# Patient Record
Sex: FEMALE | Race: WHITE | URBAN - METROPOLITAN AREA
[De-identification: names, ages, dates, MRNs, and addresses within clinical notes are randomized per-mention and may not be internally consistent; named-entity substitution may affect disease eponyms.]

---

## 2017-01-30 DIAGNOSIS — N92.6 IRREGULAR MENSTRUAL CYCLE: Primary | ICD-10-CM

## 2017-01-30 NOTE — TELEPHONE ENCOUNTER
Previfem      Last Written Prescription Date: 6/17/16  Last Fill Quantity: 84,  # refills: 2   Last Office Visit with [facility], P or [facility] prescribing provider: 8/30/16    Emily Mccormack CMA

## 2017-01-31 RX ORDER — NORGESTIMATE AND ETHINYL ESTRADIOL
KIT
Qty: 84 TABLET | Refills: 1 | Status: SHIPPED | OUTPATIENT
Start: 2017-01-31 | End: 2017-05-18

## 2017-05-18 ENCOUNTER — OFFICE VISIT (OUTPATIENT)
Dept: FAMILY MEDICINE | Facility: CLINIC | Age: 27
End: 2017-05-18
Payer: COMMERCIAL

## 2017-05-18 VITALS
HEIGHT: 71 IN | DIASTOLIC BLOOD PRESSURE: 78 MMHG | BODY MASS INDEX: 39.76 KG/M2 | OXYGEN SATURATION: 100 % | RESPIRATION RATE: 16 BRPM | SYSTOLIC BLOOD PRESSURE: 114 MMHG | WEIGHT: 284 LBS | TEMPERATURE: 98.7 F | HEART RATE: 81 BPM

## 2017-05-18 DIAGNOSIS — L70.0 ACNE VULGARIS: ICD-10-CM

## 2017-05-18 DIAGNOSIS — Z00.00 ENCOUNTER FOR ROUTINE ADULT HEALTH EXAMINATION WITHOUT ABNORMAL FINDINGS: Primary | ICD-10-CM

## 2017-05-18 DIAGNOSIS — E66.812 OBESITY, CLASS II, BMI 35-39.9: ICD-10-CM

## 2017-05-18 DIAGNOSIS — N92.6 IRREGULAR MENSTRUAL CYCLE: ICD-10-CM

## 2017-05-18 DIAGNOSIS — Z11.3 SCREEN FOR STD (SEXUALLY TRANSMITTED DISEASE): ICD-10-CM

## 2017-05-18 PROCEDURE — 87591 N.GONORRHOEAE DNA AMP PROB: CPT | Performed by: NURSE PRACTITIONER

## 2017-05-18 PROCEDURE — 99395 PREV VISIT EST AGE 18-39: CPT | Performed by: NURSE PRACTITIONER

## 2017-05-18 PROCEDURE — 87491 CHLMYD TRACH DNA AMP PROBE: CPT | Performed by: NURSE PRACTITIONER

## 2017-05-18 RX ORDER — NORGESTIMATE AND ETHINYL ESTRADIOL 0.25-0.035
1 KIT ORAL DAILY
Qty: 84 TABLET | Refills: 4 | Status: SHIPPED | OUTPATIENT
Start: 2017-05-18 | End: 2018-07-16

## 2017-05-18 NOTE — NURSING NOTE
"Chief Complaint   Patient presents with     Physical       Initial /78 (Cuff Size: Adult Large)  Pulse 81  Temp 98.7  F (37.1  C) (Tympanic)  Resp 16  Ht 5' 11\" (1.803 m)  Wt 284 lb (128.8 kg)  LMP 04/20/2017 (Approximate)  SpO2 100%  BMI 39.61 kg/m2 Estimated body mass index is 39.61 kg/(m^2) as calculated from the following:    Height as of this encounter: 5' 11\" (1.803 m).    Weight as of this encounter: 284 lb (128.8 kg).  Medication Reconciliation: complete   Lucila Johnson, CMA    "

## 2017-05-18 NOTE — MR AVS SNAPSHOT
After Visit Summary   5/18/2017    Stephanie Carmichael    MRN: [id number]           Patient Information     Date Of Birth          1990        Visit Information        Provider Department      5/18/2017 4:30 PM Lily Mace [place]N [facility]        Today's Diagnoses     Encounter for routine adult health examination without abnormal findings    -  1    Acne vulgaris        Irregular menstrual cycle        Obesity, Class II, BMI 35-39.9 (H)        Screen for STD (sexually transmitted disease)          Care Instructions      Preventive Health Recommendations  Female Ages 26 - 39  Yearly exam:   See your health care provider every year in order to    Review health changes.     Discuss preventive care.      Review your medicines if you your doctor has prescribed any.    Until age 30: Get a Pap test every three years (more often if you have had an abnormal result).    After age 30: Talk to your doctor about whether you should have a Pap test every 3 years or have a Pap test with HPV screening every 5 years.   You do not need a Pap test if your uterus was removed (hysterectomy) and you have not had cancer.  You should be tested each year for STDs (sexually transmitted diseases), if you're at risk.   Talk to your provider about how often to have your cholesterol checked.  If you are at risk for diabetes, you should have a diabetes test (fasting glucose).  Shots: Get a flu shot each year. Get a tetanus shot every 10 years.   Nutrition:     Eat at least 5 servings of fruits and vegetables each day.    Eat whole-grain bread, whole-wheat pasta and brown rice instead of white grains and rice.    Talk to your provider about Calcium and Vitamin D.     Lifestyle    Exercise at least 150 minutes a week (30 minutes a day, 5 days of the week). This will help you control your weight and prevent disease.    Limit alcohol to one drink per day.    No smoking.     Wear sunscreen to prevent skin  "cancer.    See your dentist every six months for an exam and cleaning.          Follow-ups after your visit        Who to contact     If you have questions or need follow up information about today's clinic visit or your schedule please contact [facility] [place] directly at [phone].  Normal or non-critical lab and imaging results will be communicated to you by MyChart, letter or phone within 4 business days after the clinic has received the results. If you do not hear from us within 7 days, please contact the clinic through Novanhart or phone. If you have a critical or abnormal lab result, we will notify you by phone as soon as possible.  Submit refill requests through Traklight or call your pharmacy and they will forward the refill request to us. Please allow 3 business days for your refill to be completed.          Additional Information About Your Visit        MyChart Information     Traklight gives you secure access to your electronic health record. If you see a primary care provider, you can also send messages to your care team and make appointments. If you have questions, please call your primary care clinic.  If you do not have a primary care provider, please call [phone] and they will assist you.        Care EveryWhere ID     This is your Care EveryWhere ID. This could be used by other organizations to access your [facility] medical records  [id number]        Your Vitals Were     Pulse Temperature Respirations Height Last Period Pulse Oximetry    81 98.7  F (37.1  C) (Tympanic) 16 5' 11\" (1.803 m) 04/20/2017 (Approximate) 100%    BMI (Body Mass Index)                   39.61 kg/m2            Blood Pressure from Last 3 Encounters:   05/18/17 114/78   08/30/16 126/74   03/14/16 117/79    Weight from Last 3 Encounters:   05/18/17 284 lb (128.8 kg)   08/30/16 273 lb (123.8 kg)   03/14/16 276 lb (125.2 kg)              We Performed the Following     Chlamydia trachomatis PCR     Neisseria " gonorrhoeae PCR          Today's Medication Changes          These changes are accurate as of: 5/18/17  5:16 PM.  If you have any questions, ask your nurse or doctor.               These medicines have changed or have updated prescriptions.        Dose/Directions    norgestimate-ethinyl estradiol 0.25-35 MG-MCG per tablet   Commonly known as:  PREVIFEM   This may have changed:  See the new instructions.   Used for:  Irregular menstrual cycle   Changed by:  Lily Mace APRN CNP        Dose:  1 tablet   Take 1 tablet by mouth daily   Quantity:  84 tablet   Refills:  4            Where to get your medicines      These medications were sent to [id number] - [place] - [id number]  [id number], [place] [address]     Phone:  [phone]     norgestimate-ethinyl estradiol 0.25-35 MG-MCG per tablet                Primary Care Provider Office Phone # Fax #    SANGITA Patrick [phone]       [address]  [place] [address]        Thank you!     Thank you for choosing [facility]  for your care. Our goal is always to provide you with excellent care. Hearing back from our patients is one way we can continue to improve our services. Please take a few minutes to complete the written survey that you may receive in the mail after your visit with us. Thank you!             Your Updated Medication List - Protect others around you: Learn how to safely use, store and throw away your medicines at www.disposemymeds.org.          This list is accurate as of: 5/18/17  5:16 PM.  Always use your most recent med list.                   Brand Name Dispense Instructions for use    DAILY MULTIVITAMIN PO      Take by mouth daily       norgestimate-ethinyl estradiol 0.25-35 MG-MCG per tablet    PREVIFEM    84 tablet    Take 1 tablet by mouth daily

## 2017-05-18 NOTE — PROGRESS NOTES
SUBJECTIVE:     CC: Stephanie Carmichael is an 26 year old woman who presents for preventive health visit.     Healthy Habits:    Do you get at least three servings of calcium containing foods daily (dairy, green leafy vegetables, etc.)? yes    Amount of exercise or daily activities, outside of work: 1 day(s) per week    Problems taking medications regularly No    Medication side effects: No    Have you had an eye exam in the past two years? yes    Do you see a dentist twice per year? no    Do you have sleep apnea, excessive snoring or daytime drowsiness?no        Today's PHQ-2 Score:   PHQ-2 ( 1999 Pfizer) 8/30/2016 3/14/2016   Q1: Little interest or pleasure in doing things 0 0   Q2: Feeling down, depressed or hopeless 0 0   PHQ-2 Score 0 0       Abuse: Current or Past(Physical, Sexual or Emotional)- No  Do you feel safe in your environment - Yes    Social History   Substance Use Topics     Smoking status: Never Smoker     Smokeless tobacco: Never Used     Alcohol use 0.0 - 0.6 oz/week     0 - 1 Standard drinks or equivalent per week      Comment: 1 drink per month     The patient does not drink >3 drinks per day nor >7 drinks per week.    Recent Labs   Lab Test  02/12/14   0942  12/30/11   1110   CHOL  178  169   HDL  61  66   LDL  97  82   TRIG  101  108   CHOLHDLRATIO  2.9  2.6       Reviewed orders with patient.  Reviewed health maintenance and updated orders accordingly - Yes    Mammo Decision Support:  Mammo discussed, not appropriate for or declined by this patient.       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  Has college degree in nutrition. .       ROS:  C: NEGATIVE for fever, chills, change in weight  I: NEGATIVE for worrisome rashes, moles or lesions  E: NEGATIVE for vision changes or irritation; glasses  ENT: NEGATIVE for ear, mouth and throat problems  R: NEGATIVE for significant cough or SOB  B: NEGATIVE for masses, tenderness or discharge  CV: NEGATIVE for chest pain, palpitations or peripheral edema  GI: NEGATIVE for nausea, abdominal pain, heartburn, or change in bowel habits  : NEGATIVE for unusual urinary or vaginal symptoms. Periods are regular. Takes BCP to control acne  M: NEGATIVE for significant arthralgias or myalgia  N: NEGATIVE for weakness, dizziness or paresthesias  P: NEGATIVE for changes in mood or affect      OBJECTIVE:     /78 (Cuff Size: Adult Large)  Pulse 81  Temp 98.7  F (37.1  C) (Tympanic)  Resp 16  Ht 5' 11\" (1.803 m)  Wt 284 lb (128.8 kg)  LMP 04/20/2017 (Approximate)  SpO2 100%  BMI 39.61 kg/m2  EXAM:  GENERAL: healthy, alert and no distress  EYES: Eyes grossly normal to inspection, PERRL and conjunctivae and sclerae normal  HENT: ear canals and TM's normal, nose and mouth without ulcers or lesions  NECK: no adenopathy, no asymmetry, masses, or scars and thyroid normal to palpation  RESP: lungs clear to auscultation - no rales, rhonchi or wheezes  BREAST: normal without masses, tenderness or nipple discharge and no palpable axillary masses or adenopathy  CV: regular rate and rhythm, normal S1 S2, no S3 or S4, no murmur, click or rub, no peripheral edema and peripheral pulses strong  ABDOMEN: soft, nontender, no hepatosplenomegaly, no masses and bowel sounds normal  MS: no gross musculoskeletal defects noted, no edema  SKIN: no suspicious lesions or rashes  NEURO: Normal " "strength and tone, mentation intact and speech normal  PSYCH: mentation appears normal, affect normal/bright    ASSESSMENT/PLAN:     Stephanie was seen today for physical.    Diagnoses and all orders for this visit:    Encounter for routine adult health examination without abnormal findings    Acne vulgaris    Irregular menstrual cycle  -     norgestimate-ethinyl estradiol (PREVIFEM) 0.25-35 MG-MCG per tablet; Take 1 tablet by mouth daily    Obesity, Class II, BMI 35-39.9 (H)    Screen for STD (sexually transmitted disease)  -     Chlamydia trachomatis PCR  -     Neisseria gonorrhoeae PCR        COUNSELING:   Reviewed preventive health counseling, as reflected in patient instructions  Special attention given to:        Regular exercise       Healthy diet/nutrition       Vision screening       Contraception       Safe sex practices/STD prevention         reports that she has never smoked. She has never used smokeless tobacco.    Estimated body mass index is 38.08 kg/(m^2) as calculated from the following:    Height as of 8/30/16: 5' 11\" (1.803 m).    Weight as of 8/30/16: 273 lb (123.8 kg).   Weight management plan: Discussed healthy diet and exercise guidelines and patient will follow up in 12 months in clinic to re-evaluate.    Counseling Resources:  ATP IV Guidelines  Pooled Cohorts Equation Calculator  Breast Cancer Risk Calculator  FRAX Risk Assessment  ICSI Preventive Guidelines  Dietary Guidelines for Americans, 2010  USDA's MyPlate  ASA Prophylaxis  Lung CA Screening    [facility]  "

## 2017-05-21 LAB
C TRACH DNA SPEC QL NAA+PROBE: NORMAL
N GONORRHOEA DNA SPEC QL NAA+PROBE: NORMAL
SPECIMEN SOURCE: NORMAL
SPECIMEN SOURCE: NORMAL

## 2017-09-12 ENCOUNTER — OFFICE VISIT (OUTPATIENT)
Dept: PODIATRY | Facility: CLINIC | Age: 27
End: 2017-09-12
Payer: COMMERCIAL

## 2017-09-12 VITALS
DIASTOLIC BLOOD PRESSURE: 88 MMHG | HEIGHT: 71 IN | WEIGHT: 283.8 LBS | SYSTOLIC BLOOD PRESSURE: 127 MMHG | BODY MASS INDEX: 39.73 KG/M2 | HEART RATE: 96 BPM

## 2017-09-12 DIAGNOSIS — B35.1 ONYCHOMYCOSIS: ICD-10-CM

## 2017-09-12 DIAGNOSIS — L60.3 ONYCHODYSTROPHY: Primary | ICD-10-CM

## 2017-09-12 PROCEDURE — 99203 OFFICE O/P NEW LOW 30 MIN: CPT | Performed by: PODIATRIST

## 2017-09-12 NOTE — PROGRESS NOTES
Weight management plan: Patient was referred to their PCP to discuss a diet and exercise plan.     LUZ Camejo MA September 12, 2017 4:36 PM

## 2017-09-12 NOTE — MR AVS SNAPSHOT
After Visit Summary   9/12/2017    Stephanie Carmichael    MRN: [id number]           Patient Information     Date Of Birth          1990        Visit Information        Provider Department      9/12/2017 4:30 PM Froilan Oneill DPM [facility]        Care Instructions    NAIL FUNGUS / ONYCHOMYCOSIS   Nail fungus is not a hygiene problem and will not likely lead to significant medical   problems. The nails may get thick causing pain and possibly local skin infection.   Treatments include debridement (trimming), oral antifungals, topical antifungals and complete removal of the nail. Most fungal nails are not treated.   Topicals such as tea tree oil can be helpful for surface fungus and may, at best, limit   progression. Over the counter creams (such as Lamisil) can also be used however, their effectiveness is also quite low.  Topical treatment with Pen lac is expensive and often not covered by insurance. Pen lac has an approximate 8% success rate. Topical therapy recommendations is to apply twice a day for at least 3-4 months as it takes 9 months for new nail to grow out.    Experts suggest soaking your feet for 15 to 20 minutes in a mixture of 1 cup vinegar to 4 cups warm water. Be sure to rinse well and pat your feet dry when you're done. You can soak your feet like this daily. But if your skin becomes irritated, try soaking only two to three times a week. Vicks VapoRub, as with vinegar, there have been no controlled clinical trials to assess the effectiveness of Vicks VapoRub on nail fungus, but there have been numerous anecdotal reports that it works. There's no consensus on how often to apply this product, so check with your doctor before using it on your nails.     Oral therapies include Sporanox and Lamisil. Oral therapies are also expensive and not very effective. Side effects such as liver disease are the main concern. Return of fungus is common even if the treatment worked.  "    Other Tips:  - Penlac nail medication apply daily x 4 months; remove old polish first day of each week  - Antifungal cream/powder (Zeasorb) - apply daily to feet and shoes x 2 months  - Clean shoes with Lysol or in washing machine every few weeks  - Rotate shoe gear; give them 24 hours to dry out between days wearing them  - Clean pair of socks in morning, clean pair in afternoon if your feet sweat  - Shower shoes used in public showers/pools            Follow-ups after your visit        Follow-up notes from your care team     Return if symptoms worsen or fail to improve.      Who to contact     If you have questions or need follow up information about today's clinic visit or your schedule please contact [facility] directly at [phone].  Normal or non-critical lab and imaging results will be communicated to you by MyChart, letter or phone within 4 business days after the clinic has received the results. If you do not hear from us within 7 days, please contact the clinic through Magentohart or phone. If you have a critical or abnormal lab result, we will notify you by phone as soon as possible.  Submit refill requests through Reconnex or call your pharmacy and they will forward the refill request to us. Please allow 3 business days for your refill to be completed.          Additional Information About Your Visit        Magentohart Information     Reconnex gives you secure access to your electronic health record. If you see a primary care provider, you can also send messages to your care team and make appointments. If you have questions, please call your primary care clinic.  If you do not have a primary care provider, please call [phone] and they will assist you.        Care EveryWhere ID     This is your Care EveryWhere ID. This could be used by other organizations to access your [facility] medical records  [id number]        Your Vitals Were     Pulse Height BMI (Body Mass Index)             96 5' 11\" " (1.803 m) 39.58 kg/m2          Blood Pressure from Last 3 Encounters:   09/12/17 127/88   05/18/17 114/78   08/30/16 126/74    Weight from Last 3 Encounters:   09/12/17 283 lb 12.8 oz (128.7 kg)   05/18/17 284 lb (128.8 kg)   08/30/16 273 lb (123.8 kg)              Today, you had the following     No orders found for display       Primary Care Provider Office Phone # Fax #    SANGIAT Patrick [phone]       [address]  [place] [address]        Equal Access to Services     [facility] : Hadii hany Zayas, waaxda lujaradadaha, qaybta kaalmada ugo, chuckie pozo . So [facility] [phone].    ATENCIÓN: Si habla español, tiene a green disposición servicios gratuitos de asistencia lingüística. Llame al [phone].    We comply with applicable federal civil rights laws and [place] laws. We do not discriminate on the basis of race, color, national origin, age, disability sex, sexual orientation or gender identity.            Thank you!     Thank you for choosing [facility]  for your care. Our goal is always to provide you with excellent care. Hearing back from our patients is one way we can continue to improve our services. Please take a few minutes to complete the written survey that you may receive in the mail after your visit with us. Thank you!             Your Updated Medication List - Protect others around you: Learn how to safely use, store and throw away your medicines at www.disposemymeds.org.          This list is accurate as of: 9/12/17  4:49 PM.  Always use your most recent med list.                   Brand Name Dispense Instructions for use Diagnosis    DAILY MULTIVITAMIN PO      Take by mouth daily        norgestimate-ethinyl estradiol 0.25-35 MG-MCG per tablet    PREVIFEM    84 tablet    Take 1 tablet by mouth daily    Irregular menstrual cycle

## 2017-09-12 NOTE — NURSING NOTE
"Chief Complaint   Patient presents with     Toenail     R 1st toenail is discolored and feels like it's coming off       Initial /88 (BP Location: Left arm, Patient Position: Chair, Cuff Size: Adult Large)  Pulse 96  Ht 5' 11\" (1.803 m)  Wt 283 lb 12.8 oz (128.7 kg)  BMI 39.58 kg/m2 Estimated body mass index is 39.58 kg/(m^2) as calculated from the following:    Height as of this encounter: 5' 11\" (1.803 m).    Weight as of this encounter: 283 lb 12.8 oz (128.7 kg).  Medication Reconciliation: ruy Camejo MA September 12, 2017 4:36 PM  "

## 2017-09-13 NOTE — PROGRESS NOTES
PATIENT HISTORY:  Stephanie Carmichael is a 27 year old female who presents to clinic for right 1st toenail thickening.  Pt reports a procedure here for ingrown nail 3-4 years ago.  New area of darkening noted in nail per pt.  Unsure if there was an injury.  No pain.  Nail has turned yellow.  She has tried an otc antifungal w/o improvement.      Review of Systems:  Patient denies fever, chills, rash, wound, stiffness, limping, numbness, weakness, heart burn, blood in stool, chest pain with activity, calf pain when walking, shortness of breath with activity, chronic cough, easy bleeding/bruising, swelling of ankles, excessive thirst, fatigue, depression, anxiety.       PAST MEDICAL HISTORY:   Past Medical History:   Diagnosis Date     Acne age 13    face and back     ASCUS with positive high risk HPV 01/03/2013    normal colposcopy 2/5/2013     Herpes zoster     age 10,  back     Irregular menstrual cycle     tx'd with BCP     Seasonal allergies     spring        PAST SURGICAL HISTORY:   Past Surgical History:   Procedure Laterality Date     NO HISTORY OF SURGERY          MEDICATIONS:   Current Outpatient Prescriptions:      norgestimate-ethinyl estradiol (PREVIFEM) 0.25-35 MG-MCG per tablet, Take 1 tablet by mouth daily, Disp: 84 tablet, Rfl: 4     Multiple Vitamins-Minerals (DAILY MULTIVITAMIN PO), Take by mouth daily, Disp: , Rfl:      ALLERGIES:    Allergies   Allergen Reactions     Cats Other (See Comments)     Runny nose. Itchy eyes.     No Known Allergies         SOCIAL HISTORY:   Social History     Social History     Marital status: Single     Spouse name: N/A     Number of children: 0     Years of education: 13     Occupational History           engineering     Social History Main Topics     Smoking status: Never Smoker     Smokeless tobacco: Never Used     Alcohol use 0.0 - 0.6 oz/week     0 - 1 Standard drinks or equivalent per week      Comment: 1 drink per month     Drug use: No      "Sexual activity: Yes     Partners: Male     Birth control/ protection: Condom, Pill     Other Topics Concern      Service No     Blood Transfusions No     Caffeine Concern Yes     3 teas and 2 sodas per week     Occupational Exposure No     Hobby Hazards No     Sleep Concern No     Stress Concern No     Weight Concern Yes     Special Diet No     Back Care Yes     has left sided back pain     Exercise Yes     Bike Helmet No     n/a     Seat Belt Yes     Self-Exams No     Parent/Sibling W/ Cabg, Mi Or Angioplasty Before 65f 55m? No     Social History Narrative        FAMILY HISTORY:   Family History   Problem Relation Age of Onset     Hypertension Maternal Grandmother      Arthritis Maternal Grandmother      DIABETES Maternal Grandmother      diet controlled     CANCER Maternal Grandmother      skin cancer on face and arms - melanoma     CEREBROVASCULAR DISEASE Paternal Grandmother      Arthritis Paternal Grandmother      CANCER Paternal Grandmother      skin cancer on face and arms - melanoma     Arthritis Maternal Grandfather      CANCER Maternal Grandfather      skin cancer on face and arms - melanoma     Arthritis Paternal Grandfather      Cancer - colorectal Paternal Grandfather      at age 55     CANCER Paternal Grandfather      skin cancer on face and arms - melanoma     Lipids Paternal Grandfather      Connective Tissue Disorder Mother      carrier of mucopolysarcchardosis     Breast Cancer Other         EXAM:Vitals: /88 (BP Location: Left arm, Patient Position: Chair, Cuff Size: Adult Large)  Pulse 96  Ht 5' 11\" (1.803 m)  Wt 283 lb 12.8 oz (128.7 kg)  BMI 39.58 kg/m2  BMI= Body mass index is 39.58 kg/(m^2).    General appearance: Patient is alert and fully cooperative with history & exam.  No sign of distress is noted during the visit.     Psychiatric: Affect is pleasant & appropriate.  Patient appears motivated to improve health.     Respiratory: Breathing is regular & unlabored while " sitting.     HEENT: Hearing is intact to spoken word.  Speech is clear.  No gross evidence of visual impairment that would impact ambulation.     Dermatologic: R hallux nail thickened, yellow, dystrophic, with subungual bruising.  Part of nail obscured by gel nail polish, which could not be removed.  No paronychia or evidence of soft tissue infection is noted.     Vascular: DP & PT pulses are intact & regular on the right.  No significant edema or varicosities noted.  CFT and skin temperature are normal.     Neurologic: Lower extremity sensation is intact to light touch.  No evidence of weakness or contracture in the lower extremities.  No evidence of neuropathy.     Musculoskeletal: Patient is ambulatory without assistive device or brace.  No gross ankle deformity noted.  No foot or ankle joint effusion is noted.     ASSESSMENT: R hallux onychodystrophy, onychomycosis     PLAN:  Reviewed patient's chart in epic.  Discussed condition and treatment options including pros and cons.    Nail changes likely multifactorial from trauma, fungus.    Discussed causes of nail fungus.  Discussed treatment options with patient and explained that there isn't one treatment that is 100% effective.  Debridement is an option.  Discussed oral lamisil which is the most effective at about 70% but can have liver effects.  Discussed over the counter antifungal creams.  Explained that these are less effective and need to be applied twice a day for about 3-4 months.  Also talked about prescription topicals, which have similar efficacy.  Also discussed that if there was damage to the nail and the nail is now dystrophic that none of the above is going to change the nail.  Discussed that if it becomes painful, we can remove the nail in clinic.  The patient elected to try otc topical such as Vicks once/day.    Advised f/u if dark area is not clearing as nail grows, as nail avulsion may be needed to inspect underlying skin.  F/u prn.          Froilan Oneill, DPM, FACFAS

## 2018-04-14 ENCOUNTER — HEALTH MAINTENANCE LETTER (OUTPATIENT)
Age: 28
End: 2018-04-14

## 2018-07-06 ENCOUNTER — OFFICE VISIT (OUTPATIENT)
Dept: FAMILY MEDICINE | Facility: CLINIC | Age: 28
End: 2018-07-06
Payer: COMMERCIAL

## 2018-07-06 VITALS
HEIGHT: 70 IN | WEIGHT: 277 LBS | BODY MASS INDEX: 39.65 KG/M2 | TEMPERATURE: 99.6 F | SYSTOLIC BLOOD PRESSURE: 112 MMHG | DIASTOLIC BLOOD PRESSURE: 78 MMHG | HEART RATE: 92 BPM

## 2018-07-06 DIAGNOSIS — Z00.00 HEALTH CARE MAINTENANCE: Primary | ICD-10-CM

## 2018-07-06 LAB
ALBUMIN SERPL-MCNC: 3.4 G/DL (ref 3.4–5)
ALP SERPL-CCNC: 138 U/L (ref 40–150)
ALT SERPL W P-5'-P-CCNC: 15 U/L (ref 0–50)
ANION GAP SERPL CALCULATED.3IONS-SCNC: 11 MMOL/L (ref 3–14)
AST SERPL W P-5'-P-CCNC: 13 U/L (ref 0–45)
BILIRUB SERPL-MCNC: 0.4 MG/DL (ref 0.2–1.3)
BUN SERPL-MCNC: 5 MG/DL (ref 7–30)
CALCIUM SERPL-MCNC: 9.1 MG/DL (ref 8.5–10.1)
CHLORIDE SERPL-SCNC: 108 MMOL/L (ref 94–109)
CHOLEST SERPL-MCNC: 160 MG/DL
CO2 SERPL-SCNC: 23 MMOL/L (ref 20–32)
CREAT SERPL-MCNC: 0.61 MG/DL (ref 0.52–1.04)
ERYTHROCYTE [DISTWIDTH] IN BLOOD BY AUTOMATED COUNT: 13.1 % (ref 10–15)
GFR SERPL CREATININE-BSD FRML MDRD: >90 ML/MIN/1.7M2
GLUCOSE SERPL-MCNC: 93 MG/DL (ref 70–99)
HCT VFR BLD AUTO: 42.1 % (ref 35–47)
HDLC SERPL-MCNC: 54 MG/DL
HGB BLD-MCNC: 13.9 G/DL (ref 11.7–15.7)
LDLC SERPL CALC-MCNC: 84 MG/DL
MCH RBC QN AUTO: 30.5 PG (ref 26.5–33)
MCHC RBC AUTO-ENTMCNC: 33 G/DL (ref 31.5–36.5)
MCV RBC AUTO: 93 FL (ref 78–100)
NONHDLC SERPL-MCNC: 106 MG/DL
PLATELET # BLD AUTO: 306 10E9/L (ref 150–450)
POTASSIUM SERPL-SCNC: 4.1 MMOL/L (ref 3.4–5.3)
PROT SERPL-MCNC: 7.8 G/DL (ref 6.8–8.8)
RBC # BLD AUTO: 4.55 10E12/L (ref 3.8–5.2)
SODIUM SERPL-SCNC: 142 MMOL/L (ref 133–144)
TRIGL SERPL-MCNC: 110 MG/DL
WBC # BLD AUTO: 8 10E9/L (ref 4–11)

## 2018-07-06 PROCEDURE — 99395 PREV VISIT EST AGE 18-39: CPT | Performed by: FAMILY MEDICINE

## 2018-07-06 PROCEDURE — 85027 COMPLETE CBC AUTOMATED: CPT | Performed by: FAMILY MEDICINE

## 2018-07-06 PROCEDURE — G0145 SCR C/V CYTO,THINLAYER,RESCR: HCPCS | Performed by: FAMILY MEDICINE

## 2018-07-06 PROCEDURE — 80061 LIPID PANEL: CPT | Performed by: FAMILY MEDICINE

## 2018-07-06 PROCEDURE — 80053 COMPREHEN METABOLIC PANEL: CPT | Performed by: FAMILY MEDICINE

## 2018-07-06 PROCEDURE — 36415 COLL VENOUS BLD VENIPUNCTURE: CPT | Performed by: FAMILY MEDICINE

## 2018-07-06 NOTE — MR AVS SNAPSHOT
After Visit Summary   7/6/2018    Stephanie Carmichael    MRN: [id number]           Patient Information     Date Of Birth          1990        Visit Information        Provider Department      7/6/2018 9:20 AM Karlos Wiley MD [facility] [place] [facility] [place] maintenance    -  1      Care Instructions      Preventive Health Recommendations  Female Ages 26 - 39  Yearly exam:   See your health care provider every year in order to    Review health changes.     Discuss preventive care.      Review your medicines if you your doctor has prescribed any.    Until age 30: Get a Pap test every three years (more often if you have had an abnormal result).    After age 30: Talk to your doctor about whether you should have a Pap test every 3 years or have a Pap test with HPV screening every 5 years.   You do not need a Pap test if your uterus was removed (hysterectomy) and you have not had cancer.  You should be tested each year for STDs (sexually transmitted diseases), if you're at risk.   Talk to your provider about how often to have your cholesterol checked.  If you are at risk for diabetes, you should have a diabetes test (fasting glucose).  Shots: Get a flu shot each year. Get a tetanus shot every 10 years.   Nutrition:     Eat at least 5 servings of fruits and vegetables each day.    Eat whole-grain bread, whole-wheat pasta and brown rice instead of white grains and rice.    Get adequate Calcium and Vitamin D.     Lifestyle    Exercise at least 150 minutes a week (30 minutes a day, 5 days of the week). This will help you control your weight and prevent disease.    Limit alcohol to one drink per day.    No smoking.     Wear sunscreen to prevent skin cancer.    See your dentist every six months for an exam and cleaning.            Follow-ups after your visit        Follow-up notes from your care team     Return in about 1 year (around 7/6/2019) for Physical Exam.      Who  "to contact     If you have questions or need follow up information about today's clinic visit or your schedule please contact [facility] directly at [phone].  Normal or non-critical lab and imaging results will be communicated to you by MyChart, letter or phone within 4 business days after the clinic has received the results. If you do not hear from us within 7 days, please contact the clinic through MyChart or phone. If you have a critical or abnormal lab result, we will notify you by phone as soon as possible.  Submit refill requests through [employer] or call your pharmacy and they will forward the refill request to us. Please allow 3 business days for your refill to be completed.          Additional Information About Your Visit        [employer] Information     [employer] gives you secure access to your electronic health record. If you see a primary care provider, you can also send messages to your care team and make appointments. If you have questions, please call your primary care clinic.  If you do not have a primary care provider, please call [phone] and they will assist you.        Care EveryWhere ID     This is your Care EveryWhere ID. This could be used by other organizations to access your [place] medical records  [id number]        Your Vitals Were     Pulse Temperature Height Last Period BMI (Body Mass Index)       92 99.6  F (37.6  C) (Tympanic) 5' 10\" (1.778 m) 06/14/2018 39.75 kg/m2        Blood Pressure from Last 3 Encounters:   07/06/18 112/78   09/12/17 127/88   05/18/17 114/78    Weight from Last 3 Encounters:   07/06/18 277 lb (125.6 kg)   09/12/17 283 lb 12.8 oz (128.7 kg)   05/18/17 284 lb (128.8 kg)              We Performed the Following     CBC with platelets     Comprehensive metabolic panel     Lipid panel reflex to direct LDL Fasting     PAP imaged thin layer screen reflex to HPV if ASCUS - recommended age 25 - 29 years        Primary Care Provider Office Phone # " Fax #    [facility] [phone]       [address] [facility] [address]        Equal Access to Services     YUMIKO DEAN : Hadii aad ku hadleelao Socheryali, waaxda luqadaha, qaybta kaalmada adebrunildada, chuckie carreon farzanabrandy shaw sánchez barber. So [facility] [phone].    ATENCIÓN: Si habla español, tiene a green disposición servicios gratuitos de asistencia lingüística. Llame al [phone].    We comply with applicable federal civil rights laws and [place] laws. We do not discriminate on the basis of race, color, national origin, age, disability, sex, sexual orientation, or gender identity.            Thank you!     Thank you for [place] [facility]  [place]. Our goal is always to provide you with excellent care. Hearing back from our patients is one way we can continue to improve our services. Please take a few minutes to complete the written survey that you may receive in the mail after your visit with us. Thank you!             Your Updated Medication List - Protect others around you: Learn how to safely use, store and throw away your medicines at www.disposemymeds.org.          This list is accurate as of 7/6/18  9:43 AM.  Always use your most recent med list.                   Brand Name Dispense Instructions for use Diagnosis    DAILY MULTIVITAMIN PO      Take by mouth daily        norgestimate-ethinyl estradiol 0.25-35 MG-MCG per tablet    PREVIFEM    84 tablet    Take 1 tablet by mouth daily    Irregular menstrual cycle

## 2018-07-06 NOTE — PROGRESS NOTES
SUBJECTIVE:   CC: Stephanie Carmichael is an 28 year old woman who presents for preventive health visit.     Healthy Habits:    Do you get at least three servings of calcium containing foods daily (dairy, green leafy vegetables, etc.)? yes    Amount of exercise or daily activities, outside of work: 3 day(s) per week    Problems taking medications regularly No    Medication side effects: No    Have you had an eye exam in the past two years? yes    Do you see a dentist twice per year? yes    Do you have sleep apnea, excessive snoring or daytime drowsiness?no          Today's PHQ-2 Score:   PHQ-2 ( 1999 Pfizer) 7/6/2018 5/18/2017   Q1: Little interest or pleasure in doing things 0 0   Q2: Feeling down, depressed or hopeless 0 0   PHQ-2 Score 0 0       Abuse: Current or Past(Physical, Sexual or Emotional)- No  Do you feel safe in your environment - Yes    Social History   Substance Use Topics     Smoking status: Never Smoker     Smokeless tobacco: Never Used     Alcohol use 0.0 - 0.6 oz/week     0 - 1 Standard drinks or equivalent per week      Comment: 1 drink per month     If you drink alcohol do you typically have >3 drinks per day or >7 drinks per week? No                     Reviewed orders with patient.  Reviewed health maintenance and updated orders accordingly - Yes  BP Readings from Last 3 Encounters:   07/06/18 112/78   09/12/17 127/88   05/18/17 114/78    Wt Readings from Last 3 Encounters:   07/06/18 277 lb (125.6 kg)   09/12/17 283 lb 12.8 oz (128.7 kg)   05/18/17 284 lb (128.8 kg)                  Patient Active Problem List   Diagnosis     Irregular menstrual cycle     Acne     CARDIOVASCULAR SCREENING; LDL GOAL LESS THAN 160     Seasonal allergies     ASCUS with positive high risk HPV     Obesity, Class II, BMI 35-39.9     Past Surgical History:   Procedure Laterality Date     NO HISTORY OF SURGERY         Social History   Substance Use Topics     Smoking status: Never Smoker     Smokeless tobacco: Never  Used     Alcohol use 0.0 - 0.6 oz/week     0 - 1 Standard drinks or equivalent per week      Comment: 1 drink per month     Family History   Problem Relation Age of Onset     Hypertension Maternal Grandmother      Arthritis Maternal Grandmother      Diabetes Maternal Grandmother      diet controlled     Cancer Maternal Grandmother      skin cancer on face and arms - melanoma     Cerebrovascular Disease Paternal Grandmother      Arthritis Paternal Grandmother      Cancer Paternal Grandmother      skin cancer on face and arms - melanoma     Arthritis Maternal Grandfather      Cancer Maternal Grandfather      skin cancer on face and arms - melanoma     Arthritis Paternal Grandfather      Cancer - colorectal Paternal Grandfather      at age 55     Cancer Paternal Grandfather      skin cancer on face and arms - melanoma     Lipids Paternal Grandfather      Connective Tissue Disorder Mother      carrier of mucopolysarcchardosis     Breast Cancer Other          Current Outpatient Prescriptions   Medication Sig Dispense Refill     Multiple Vitamins-Minerals (DAILY MULTIVITAMIN PO) Take by mouth daily       norgestimate-ethinyl estradiol (PREVIFEM) 0.25-35 MG-MCG per tablet Take 1 tablet by mouth daily 84 tablet 4     Allergies   Allergen Reactions     Cats Other (See Comments)     Runny nose. Itchy eyes.     No Known Allergies      Recent Labs   Lab Test  02/12/14   0942  12/30/11   1110   LDL  97  82   HDL  61  66   TRIG  101  108   CR  0.51*   --    GFRESTIMATED  >90   --    GFRESTBLACK  >90   --    POTASSIUM  3.8   --         Mammogram not appropriate for this patient based on age.    Pertinent mammograms are reviewed under the imaging tab.  History of abnormal Pap smear: NO - age 21-29 PAP every 3 years recommended  PAP / HPV 2/13/2015 8/5/2013 1/3/2013   PAP NIL NIL ASC-US(A)     Reviewed and updated as needed this visit by clinical staff  Tobacco  Allergies  Meds  Med Hx  Surg Hx  Fam Hx  Soc Hx        Reviewed  "and updated as needed this visit by Provider        Past Medical History:   Diagnosis Date     Acne age 13    face and back     ASCUS with positive high risk HPV 01/03/2013    normal colposcopy 2/5/2013     Herpes zoster     age 10,  back     Irregular menstrual cycle     tx'd with BCP     Seasonal allergies     spring      Past Surgical History:   Procedure Laterality Date     NO HISTORY OF SURGERY         ROS:  CONSTITUTIONAL: NEGATIVE for fever, chills, change in weight  INTEGUMENTARU/SKIN: NEGATIVE for worrisome rashes, moles or lesions  EYES: NEGATIVE for vision changes or irritation  ENT: NEGATIVE for ear, mouth and throat problems  RESP: NEGATIVE for significant cough or SOB  BREAST: NEGATIVE for masses, tenderness or discharge  CV: NEGATIVE for chest pain, palpitations or peripheral edema  GI: NEGATIVE for nausea, abdominal pain, heartburn, or change in bowel habits  : NEGATIVE for unusual urinary or vaginal symptoms. Periods are regular.  MUSCULOSKELETAL: NEGATIVE for significant arthralgias or myalgia  NEURO: NEGATIVE for weakness, dizziness or paresthesias  PSYCHIATRIC: NEGATIVE for changes in mood or affect    OBJECTIVE:   /78  Pulse 92  Temp 99.6  F (37.6  C) (Tympanic)  Ht 5' 10\" (1.778 m)  Wt 277 lb (125.6 kg)  LMP 06/14/2018  BMI 39.75 kg/m2  EXAM:  GENERAL: healthy, alert and no distress  EYES: Eyes grossly normal to inspection, PERRL and conjunctivae and sclerae normal  HENT: ear canals and TM's normal, nose and mouth without ulcers or lesions  NECK: no adenopathy, no asymmetry, masses, or scars and thyroid normal to palpation  RESP: lungs clear to auscultation - no rales, rhonchi or wheezes  BREAST: normal without masses, tenderness or nipple discharge and no palpable axillary masses or adenopathy  CV: regular rate and rhythm, normal S1 S2, no S3 or S4, no murmur, click or rub, no peripheral edema and peripheral pulses strong  ABDOMEN: soft, nontender, no hepatosplenomegaly, no " "masses and bowel sounds normal   (female): normal female external genitalia, normal urethral meatus, vaginal mucosa, normal cervix/adnexa/uterus without masses or discharge  MS: no gross musculoskeletal defects noted, no edema  SKIN: no suspicious lesions or rashes  NEURO: Normal strength and tone, mentation intact and speech normal  BACK: no CVA tenderness, no paralumbar tenderness  PSYCH: mentation appears normal, affect normal/bright  LYMPH: no cervical, supraclavicular, axillary, or inguinal adenopathy        ASSESSMENT/PLAN:       ICD-10-CM    1. Health care maintenance Z00.00 PAP imaged thin layer screen reflex to HPV if ASCUS - recommended age 25 - 29 years     CBC with platelets     Comprehensive metabolic panel     Lipid panel reflex to direct LDL Fasting       COUNSELING:   Reviewed preventive health counseling, as reflected in patient instructions    BP Readings from Last 1 Encounters:   07/06/18 112/78     Estimated body mass index is 39.75 kg/(m^2) as calculated from the following:    Height as of this encounter: 5' 10\" (1.778 m).    Weight as of this encounter: 277 lb (125.6 kg).      Weight management plan: Discussed healthy diet and exercise guidelines and patient will follow up in 12 months in clinic to re-evaluate.     reports that she has never smoked. She has never used smokeless tobacco.      Counseling Resources:  ATP IV Guidelines  Pooled Cohorts Equation Calculator  Breast Cancer Risk Calculator  FRAX Risk Assessment  ICSI Preventive Guidelines  Dietary Guidelines for Americans, 2010  USDA's MyPlate  ASA Prophylaxis  Lung CA Screening    Karlos Wiley MD  [facility]  "

## 2018-07-10 LAB
COPATH REPORT: NORMAL
PAP: NORMAL

## 2018-07-16 DIAGNOSIS — N92.6 IRREGULAR MENSTRUAL CYCLE: ICD-10-CM

## 2018-07-16 NOTE — TELEPHONE ENCOUNTER
"Requested Prescriptions   Pending Prescriptions Disp Refills     norgestimate-ethinyl estradiol (PREVIFEM) 0.25-35 MG-MCG per tablet  Last Written Prescription Date:  5/18/17  Last Fill Quantity: 84,  # refills: 4   Last office visit: 7/6/2018 with prescribing provider:  Saurabh   Future Office Visit:     84 tablet 4     Sig: Take 1 tablet by mouth daily    Contraceptives Protocol Failed    7/16/2018 11:37 AM       Failed - Recent (12 mo) or future (30 days) visit within the authorizing provider's specialty    Patient had office visit in the last 12 months or has a visit in the next 30 days with authorizing provider or within the authorizing provider's specialty.  See \"Patient Info\" tab in inbasket, or \"Choose Columns\" in Meds & Orders section of the refill encounter.           Passed - Patient is not a current smoker if age is 35 or older       Passed - No active pregnancy on record       Passed - No positive pregnancy test in past 12 months          "

## 2018-07-17 RX ORDER — NORGESTIMATE AND ETHINYL ESTRADIOL 0.25-0.035
1 KIT ORAL DAILY
Qty: 84 TABLET | Refills: 4 | Status: SHIPPED | OUTPATIENT
Start: 2018-07-17

## 2018-07-17 NOTE — TELEPHONE ENCOUNTER
Prescription approved per [facility], [facility] or [facility] refill protocol.  Bing Tobias RN - Triage  [facility]

## 2018-07-18 ENCOUNTER — MYC MEDICAL ADVICE (OUTPATIENT)
Dept: FAMILY MEDICINE | Facility: CLINIC | Age: 28
End: 2018-07-18

## 2018-07-19 NOTE — TELEPHONE ENCOUNTER
We are sorry for her discomfort with the process.  Here is a explanation.  1. Birth control pills were already renewed at 7-17-18 by triage nurse(request encounter was dated 7-16-18  2. GC/chlam is not a routine part of CPE at her age if not separately requested with particular concerns

## 2018-09-13 ENCOUNTER — TELEPHONE (OUTPATIENT)
Dept: FAMILY MEDICINE | Facility: CLINIC | Age: 28
End: 2018-09-13

## 2018-09-13 NOTE — TELEPHONE ENCOUNTER
Pt requested MyChart Visit on 9/20/18 for the following:     Obstetrics & Gynecology Care  I might have a UTI. It burns when I go to the bathroom    Triaged to assess for need for sooner appt:     S: Pt states the burning is located on the skin between her vagina and anus     Denies fevers   Denies low back/flank pain   Denies blood in urine   Denies cloudiness   Denies odor    Only irritating when she goes to the bathroom    R: Office visit tomorrow with Rosario Lopez- seek care sooner with worsening symptoms.     Peyton GARCIA RN

## 2018-09-14 ENCOUNTER — OFFICE VISIT (OUTPATIENT)
Dept: FAMILY MEDICINE | Facility: CLINIC | Age: 28
End: 2018-09-14
Payer: COMMERCIAL

## 2018-09-14 VITALS
SYSTOLIC BLOOD PRESSURE: 122 MMHG | WEIGHT: 273 LBS | HEIGHT: 70 IN | DIASTOLIC BLOOD PRESSURE: 75 MMHG | OXYGEN SATURATION: 100 % | TEMPERATURE: 98.7 F | HEART RATE: 98 BPM | BODY MASS INDEX: 39.08 KG/M2

## 2018-09-14 DIAGNOSIS — B96.89 BV (BACTERIAL VAGINOSIS): ICD-10-CM

## 2018-09-14 DIAGNOSIS — N76.0 BV (BACTERIAL VAGINOSIS): ICD-10-CM

## 2018-09-14 DIAGNOSIS — B00.9 HSV-1 INFECTION: ICD-10-CM

## 2018-09-14 DIAGNOSIS — R30.0 DYSURIA: ICD-10-CM

## 2018-09-14 DIAGNOSIS — N76.0 VAGINITIS AND VULVOVAGINITIS: Primary | ICD-10-CM

## 2018-09-14 LAB
ALBUMIN UR-MCNC: NEGATIVE MG/DL
APPEARANCE UR: ABNORMAL
BACTERIA #/AREA URNS HPF: ABNORMAL /HPF
BILIRUB UR QL STRIP: NEGATIVE
COLOR UR AUTO: YELLOW
GLUCOSE UR STRIP-MCNC: NEGATIVE MG/DL
HGB UR QL STRIP: ABNORMAL
KETONES UR STRIP-MCNC: NEGATIVE MG/DL
LEUKOCYTE ESTERASE UR QL STRIP: ABNORMAL
NITRATE UR QL: NEGATIVE
NON-SQ EPI CELLS #/AREA URNS LPF: ABNORMAL /LPF
PH UR STRIP: 7 PH (ref 5–7)
RBC #/AREA URNS AUTO: ABNORMAL /HPF
SOURCE: ABNORMAL
SP GR UR STRIP: 1.01 (ref 1–1.03)
SPECIMEN SOURCE: ABNORMAL
UROBILINOGEN UR STRIP-ACNC: 0.2 EU/DL (ref 0.2–1)
WBC #/AREA URNS AUTO: ABNORMAL /HPF
WET PREP SPEC: ABNORMAL

## 2018-09-14 PROCEDURE — 87086 URINE CULTURE/COLONY COUNT: CPT | Performed by: NURSE PRACTITIONER

## 2018-09-14 PROCEDURE — 81001 URINALYSIS AUTO W/SCOPE: CPT | Performed by: NURSE PRACTITIONER

## 2018-09-14 PROCEDURE — 87529 HSV DNA AMP PROBE: CPT | Performed by: NURSE PRACTITIONER

## 2018-09-14 PROCEDURE — 87210 SMEAR WET MOUNT SALINE/INK: CPT | Performed by: NURSE PRACTITIONER

## 2018-09-14 PROCEDURE — 87591 N.GONORRHOEAE DNA AMP PROB: CPT | Performed by: NURSE PRACTITIONER

## 2018-09-14 PROCEDURE — 87491 CHLMYD TRACH DNA AMP PROBE: CPT | Performed by: NURSE PRACTITIONER

## 2018-09-14 PROCEDURE — 99213 OFFICE O/P EST LOW 20 MIN: CPT | Performed by: NURSE PRACTITIONER

## 2018-09-14 RX ORDER — METRONIDAZOLE 500 MG/1
500 TABLET ORAL 2 TIMES DAILY
Qty: 14 TABLET | Refills: 0 | Status: SHIPPED | OUTPATIENT
Start: 2018-09-14

## 2018-09-14 NOTE — PROGRESS NOTES
SUBJECTIVE:   Stephanie Carmichael is a 28 year old female who presents to clinic today for the following health issues:      URINARY TRACT SYMPTOMS      Duration: x2-3 days    Description  Dysuria, mostly irritation between anus and vagina, no urinary urgency or frequency  Finished menses 2 days ago     Intensity:  mild, moderate    Accompanying signs and symptoms:  Fever/chills: no   Flank pain no   Nausea and vomiting: no   Vaginal symptoms: none  Abdominal/Pelvic Pain: no     History  History of frequent UTI's: no   History of kidney stones: no   Sexually Active: YES; with new partner for 3 months, using condoms; no known STD exposure  Possibility of pregnancy: No    Precipitating or alleviating factors: None    Therapies tried and outcome: none   Outcome:       Problem list and histories reviewed & adjusted, as indicated.  Additional history: as documented    Patient Active Problem List   Diagnosis     Irregular menstrual cycle     Acne     CARDIOVASCULAR SCREENING; LDL GOAL LESS THAN 160     Seasonal allergies     ASCUS with positive high risk HPV     Obesity, Class II, BMI 35-39.9     Past Surgical History:   Procedure Laterality Date     NO HISTORY OF SURGERY         Social History   Substance Use Topics     Smoking status: Never Smoker     Smokeless tobacco: Never Used     Alcohol use 0.0 - 0.6 oz/week     0 - 1 Standard drinks or equivalent per week      Comment: 1 drink per month     Family History   Problem Relation Age of Onset     Hypertension Maternal Grandmother      Arthritis Maternal Grandmother      Diabetes Maternal Grandmother      diet controlled     Cancer Maternal Grandmother      skin cancer on face and arms - melanoma     Cerebrovascular Disease Paternal Grandmother      Arthritis Paternal Grandmother      Cancer Paternal Grandmother      skin cancer on face and arms - melanoma     Arthritis Maternal Grandfather      Cancer Maternal Grandfather      skin cancer on face and arms - melanoma  "    Arthritis Paternal Grandfather      Cancer - colorectal Paternal Grandfather      at age 55     Cancer Paternal Grandfather      skin cancer on face and arms - melanoma     Lipids Paternal Grandfather      Connective Tissue Disorder Mother      carrier of mucopolysarcchardosis     Breast Cancer Other          Current Outpatient Prescriptions   Medication Sig Dispense Refill     metroNIDAZOLE (FLAGYL) 500 MG tablet Take 1 tablet (500 mg) by mouth 2 times daily 14 tablet 0     Multiple Vitamins-Minerals (DAILY MULTIVITAMIN PO) Take by mouth daily       norgestimate-ethinyl estradiol (PREVIFEM) 0.25-35 MG-MCG per tablet Take 1 tablet by mouth daily 84 tablet 4     Allergies   Allergen Reactions     Cats Other (See Comments)     Runny nose. Itchy eyes.     No Known Allergies        Reviewed and updated as needed this visit by clinical staff       Reviewed and updated as needed this visit by Provider         ROS:  Constitutional, HEENT, cardiovascular, pulmonary, gi and gu systems are negative, except as otherwise noted.    OBJECTIVE:     /75 (BP Location: Right arm, Cuff Size: Adult Large)  Pulse 98  Temp 98.7  F (37.1  C) (Oral)  Ht 5' 10\" (1.778 m)  Wt 273 lb (123.8 kg)  SpO2 100%  BMI 39.17 kg/m2  Body mass index is 39.17 kg/(m^2).  GENERAL: healthy, alert and no distress  ABDOMEN: soft, nontender, no hepatosplenomegaly, no masses and bowel sounds normal   (female): posterior vaginal introitus is erythematous and appears to have 3 or 4  1-2 mm  tender ulcerations, normal urethral meatus, vaginal mucosa, normal cervix/adnexa/uterus without masses or discharge  SKIN: no suspicious lesions or rashes of perineum    Diagnostic Test Results:  Results for orders placed or performed in visit on 09/14/18 (from the past 24 hour(s))   *UA reflex to Microscopic and Culture ([place] [facility] (except [place] and [place])   Result Value Ref Range    Color Urine Yellow     Appearance Urine Slightly " Cloudy     Glucose Urine Negative NEG^Negative mg/dL    Bilirubin Urine Negative NEG^Negative    Ketones Urine Negative NEG^Negative mg/dL    Specific Gravity Urine 1.015 1.003 - 1.035    Blood Urine Trace (A) NEG^Negative    pH Urine 7.0 5.0 - 7.0 pH    Protein Albumin Urine Negative NEG^Negative mg/dL    Urobilinogen Urine 0.2 0.2 - 1.0 EU/dL    Nitrite Urine Negative NEG^Negative    Leukocyte Esterase Urine Moderate (A) NEG^Negative    Source Midstream Urine    Urine Microscopic   Result Value Ref Range    WBC Urine 25-50 (A) OTO5^0 - 5 /HPF    RBC Urine O - 2 OTO2^O - 2 /HPF    Squamous Epithelial /LPF Urine Moderate (A) FEW^Few /LPF    Bacteria Urine Many (A) NEG^Negative /HPF   Wet prep   Result Value Ref Range    Specimen Description Vagina     Wet Prep Clue cells seen (A)     Wet Prep No Trichomonas seen     Wet Prep No yeast seen        ASSESSMENT/PLAN:       ICD-10-CM    1. Urinary tract infection N39.0 *UA reflex to Microscopic and Culture ([place] and [facility] (except [place] and [place])     Urine Microscopic     Urine Culture Aerobic Bacterial   2. Vaginitis and vulvovaginitis N76.0 Wet prep     HSV 1 and 2 DNA by PCR     NEISSERIA GONORRHOEA PCR     CHLAMYDIA TRACHOMATIS PCR   3. BV (bacterial vaginosis) N76.0 metroNIDAZOLE (FLAGYL) 500 MG tablet    B96.89    urine result may be contamination, will watch for culture result before treating  try to keep the rash dry and protect with moisture barrier such as desitin.   I will follow up as results are available    SANGITA Alvares [facility]

## 2018-09-14 NOTE — MR AVS SNAPSHOT
"              After Visit Summary   9/14/2018    Stephanie Carmichael    MRN: [id number]           Patient Information     Date Of Birth          1990        Visit Information        Provider Department      9/14/2018 8:00 AM Rosario Lopez APRN CNP [facility]        Today's Diagnoses     Vaginitis and vulvovaginitis    -  1    BV (bacterial vaginosis)        Dysuria           Follow-ups after your visit        Follow-up notes from your care team     Return if symptoms worsen or fail to improve.      Who to contact     If you have questions or need follow up information about today's clinic visit or your schedule please contact [facility] directly at [phone].  Normal or non-critical lab and imaging results will be communicated to you by [employer], letter or phone within 4 business days after the clinic has received the results. If you do not hear from us within 7 days, please contact the clinic through [employer] or phone. If you have a critical or abnormal lab result, we will notify you by phone as soon as possible.  Submit refill requests through [employer] or call your pharmacy and they will forward the refill request to us. Please allow 3 business days for your refill to be completed.          Additional Information About Your Visit        MyChart Information     [employer] gives you secure access to your electronic health record. If you see a primary care provider, you can also send messages to your care team and make appointments. If you have questions, please call your primary care clinic.  If you do not have a primary care provider, please call [phone] and they will assist you.        Care EveryWhere ID     This is your Care EveryWhere ID. This could be used by other organizations to access your [place] medical records  [id number]        Your Vitals Were     Pulse Temperature Height Pulse Oximetry BMI (Body Mass Index)       98 98.7  F (37.1  C) (Oral) 5' 10\" (1.778 m) 100% " 39.17 kg/m2        Blood Pressure from Last 3 Encounters:   09/14/18 122/75   07/06/18 112/78   09/12/17 127/88    Weight from Last 3 Encounters:   09/14/18 273 lb (123.8 kg)   07/06/18 277 lb (125.6 kg)   09/12/17 283 lb 12.8 oz (128.7 kg)              We Performed the Following     *UA reflex to Microscopic and Culture ([place] [facility] (except [place] and [place])     CHLAMYDIA TRACHOMATIS PCR     HSV 1 and 2 DNA by PCR     NEISSERIA GONORRHOEA PCR     Urine Culture Aerobic Bacterial     Urine Microscopic     Wet prep          Today's Medication Changes          These changes are accurate as of 9/14/18  1:46 PM.  If you have any questions, ask your nurse or doctor.               Start taking these medicines.        Dose/Directions    metroNIDAZOLE 500 MG tablet   Commonly known as:  FLAGYL   Used for:  BV (bacterial vaginosis)   Started by:  Rosario Lopez APRN CNP        Dose:  500 mg   Take 1 tablet (500 mg) by mouth 2 times daily   Quantity:  14 tablet   Refills:  0            Where to get your medicines      These medications were sent to [facility] [address], [address]  [address], [address] [address]     Phone:  [phone]     metroNIDAZOLE 500 MG tablet                Primary Care Provider Office Phone # Fax #    [facility] [phone]       [address] [facility] [address]        Equal Access to Services     YUMIOK DEAN : Hadii hany ku hadasho Soomaali, waaxda luqadaha, qaybta kaalmada adeegyada, waxay jayme barber. So [facility] [phone].    ATENCIÓN: Si habla español, tiene a green disposición servicios gratuitos de asistencia lingüística. Llame al [phone].    We comply with applicable federal civil rights laws and [place] laws. We do not discriminate on the basis of race, color, national origin, age, disability, sex, sexual orientation, or gender  identity.            Thank you!     Thank you for choosing [facility]  for your care. Our goal is always to provide you with excellent care. Hearing back from our patients is one way we can continue to improve our services. Please take a few minutes to complete the written survey that you may receive in the mail after your visit with us. Thank you!             Your Updated Medication List - Protect others around you: Learn how to safely use, store and throw away your medicines at www.disposemymeds.org.          This list is accurate as of 9/14/18  1:46 PM.  Always use your most recent med list.                   Brand Name Dispense Instructions for use Diagnosis    DAILY MULTIVITAMIN PO      Take by mouth daily        metroNIDAZOLE 500 MG tablet    FLAGYL    14 tablet    Take 1 tablet (500 mg) by mouth 2 times daily    BV (bacterial vaginosis)       norgestimate-ethinyl estradiol 0.25-35 MG-MCG per tablet    PREVIFEM    84 tablet    Take 1 tablet by mouth daily    Irregular menstrual cycle

## 2018-09-15 LAB
BACTERIA SPEC CULT: NORMAL
HSV1 DNA SPEC QL NAA+PROBE: POSITIVE
HSV2 DNA SPEC QL NAA+PROBE: NEGATIVE
SPECIMEN SOURCE: ABNORMAL
SPECIMEN SOURCE: NORMAL

## 2018-09-16 LAB
C TRACH DNA SPEC QL NAA+PROBE: NEGATIVE
N GONORRHOEA DNA SPEC QL NAA+PROBE: NEGATIVE
SPECIMEN SOURCE: NORMAL
SPECIMEN SOURCE: NORMAL

## 2018-09-17 RX ORDER — VALACYCLOVIR HYDROCHLORIDE 1 G/1
1000 TABLET, FILM COATED ORAL 3 TIMES DAILY
Qty: 21 TABLET | Refills: 0 | Status: SHIPPED | OUTPATIENT
Start: 2018-09-17

## 2018-09-20 ENCOUNTER — OFFICE VISIT (OUTPATIENT)
Dept: FAMILY MEDICINE | Facility: CLINIC | Age: 28
End: 2018-09-20
Payer: COMMERCIAL

## 2018-09-20 VITALS
HEIGHT: 70 IN | TEMPERATURE: 98.3 F | RESPIRATION RATE: 14 BRPM | HEART RATE: 92 BPM | OXYGEN SATURATION: 100 % | BODY MASS INDEX: 38.94 KG/M2 | DIASTOLIC BLOOD PRESSURE: 73 MMHG | WEIGHT: 272 LBS | SYSTOLIC BLOOD PRESSURE: 107 MMHG

## 2018-09-20 DIAGNOSIS — H60.391 INFECTIVE OTITIS EXTERNA, RIGHT: Primary | ICD-10-CM

## 2018-09-20 PROCEDURE — 99213 OFFICE O/P EST LOW 20 MIN: CPT | Performed by: FAMILY MEDICINE

## 2018-09-20 RX ORDER — NEOMYCIN SULFATE, POLYMYXIN B SULFATE AND HYDROCORTISONE 10; 3.5; 1 MG/ML; MG/ML; [USP'U]/ML
4 SUSPENSION/ DROPS AURICULAR (OTIC) 4 TIMES DAILY
Qty: 10 ML | Refills: 0 | Status: SHIPPED | OUTPATIENT
Start: 2018-09-20

## 2018-09-20 NOTE — MR AVS SNAPSHOT
"              After Visit Summary   9/20/2018    Stephanie Carmichael    MRN: [id number]           Patient Information     Date Of Birth          1990        Visit Information        Provider Department      9/20/2018 10:00 AM Karlos Wiley MD [facility]        Today's Diagnoses     Infective otitis externa, right    -  1       Follow-ups after your visit        Follow-up notes from your care team     Return in about 6 months (around 3/20/2019) for Physical Exam.      Who to contact     If you have questions or need follow up information about today's clinic visit or your schedule please [facility] at [phone].  Normal or non-critical lab and imaging results will be communicated to you by MyChart, letter or phone within 4 business days after the clinic has received the results. If you do not hear from us within 7 days, please contact the clinic through [employer] or phone. If you have a critical or abnormal lab result, we will notify you by phone as soon as possible.  Submit refill requests through [employer] or call your pharmacy and they will forward the refill request to us. Please allow 3 business days for your refill to be completed.          Additional Information About Your Visit        MyChart Information     [employer] gives you secure access to your electronic health record. If you see a primary care provider, you can also send messages to your care team and make appointments. If you have questions, please call your primary care clinic.  If you do not have a primary care provider, please call [phone] and they will assist you.        Care EveryWhere ID     This is your Care EveryWhere ID. This could be used by other organizations to access your [facility] medical records  [id number]        Your Vitals Were     Pulse Temperature Respirations Height Last Period Pulse Oximetry    92 98.3  F (36.8  C) (Tympanic) 14 5' 10\" (1.778 m) 09/11/2018 (Approximate) " 100%    BMI (Body Mass Index)                   39.03 kg/m2            Blood Pressure from Last 3 Encounters:   09/20/18 107/73   09/14/18 122/75   07/06/18 112/78    Weight from Last 3 Encounters:   09/20/18 272 lb (123.4 kg)   09/14/18 273 lb (123.8 kg)   07/06/18 277 lb (125.6 kg)              Today, you had the following     No orders found for display         Today's Medication Changes          These changes are accurate as of 9/20/18 10:12 AM.  If you have any questions, ask your nurse or doctor.               Start taking these medicines.        Dose/Directions    neomycin-polymyxin-hydrocortisone 3.5-78583-0 otic suspension   Commonly known as:  CORTISPORIN   Used for:  Infective otitis externa, right   Started by:  Karlos Wiley MD        Dose:  4 drop   Place 4 drops into the right ear 4 times daily   Quantity:  10 mL   Refills:  0            Where to get your medicines      These medications were sent to [facility] [address] [address]     Phone:  [phone]     neomycin-polymyxin-hydrocortisone 3.5-74489-9 otic suspension                Primary Care Provider Office Phone # Fax #    [facility] [phone]       [address] [place] [address]        Equal Access to Services     YUMIKO DEAN AH: Hadii aad ku hadasho Soomaali, waaxda luqadaha, qaybta kaalmada adeegyada, chuckie delacruz hayham pozo . So [facility] [phone].    ATENCIÓN: Si habla español, tiene a green disposición servicios gratuitos de asistencia lingüística. Llame al [phone].    We comply with applicable federal civil rights laws and [place] laws. We do not discriminate on the basis of race, color, national origin, age, disability, sex, sexual orientation, or gender identity.            Thank you!     Thank you for choosing [facility]  for your care. Our goal is always  to provide you with excellent care. Hearing back from our patients is one way we can continue to improve our services. Please take a few minutes to complete the written survey that you may receive in the mail after your visit with us. Thank you!             Your Updated Medication List - Protect others around you: Learn how to safely use, store and throw away your medicines at www.disposemymeds.org.          This list is accurate as of 9/20/18 10:12 AM.  Always use your most recent med list.                   Brand Name Dispense Instructions for use Diagnosis    DAILY MULTIVITAMIN PO      Take by mouth daily        metroNIDAZOLE 500 MG tablet    FLAGYL    14 tablet    Take 1 tablet (500 mg) by mouth 2 times daily    BV (bacterial vaginosis)       neomycin-polymyxin-hydrocortisone 3.5-72434-0 otic suspension    CORTISPORIN    10 mL    Place 4 drops into the right ear 4 times daily    Infective otitis externa, right       norgestimate-ethinyl estradiol 0.25-35 MG-MCG per tablet    PREVIFEM    84 tablet    Take 1 tablet by mouth daily    Irregular menstrual cycle       valACYclovir 1000 mg tablet    VALTREX    21 tablet    Take 1 tablet (1,000 mg) by mouth 3 times daily    HSV-1 infection

## 2018-09-20 NOTE — PROGRESS NOTES
SUBJECTIVE:   Stephanie Carmichael is a 28 year old female who presents to clinic today for the following health issues:      Ear Pain       Duration: 2 days     Description (location/character/radiation): right ear pain     Intensity:  moderate    Accompanying signs and symptoms: none     History (similar episodes/previous evaluation): None    Precipitating or alleviating factors: None    Therapies tried and outcome: Benadryl, Advil        Problem list and histories reviewed & adjusted, as indicated.  Additional history: as documented    Patient Active Problem List   Diagnosis     Irregular menstrual cycle     Acne     CARDIOVASCULAR SCREENING; LDL GOAL LESS THAN 160     Seasonal allergies     ASCUS with positive high risk HPV     Obesity, Class II, BMI 35-39.9     Past Surgical History:   Procedure Laterality Date     NO HISTORY OF SURGERY         Social History   Substance Use Topics     Smoking status: Never Smoker     Smokeless tobacco: Never Used     Alcohol use 0.0 - 0.6 oz/week     0 - 1 Standard drinks or equivalent per week      Comment: 1 drink per month     Family History   Problem Relation Age of Onset     Hypertension Maternal Grandmother      Arthritis Maternal Grandmother      Diabetes Maternal Grandmother      diet controlled     Cancer Maternal Grandmother      skin cancer on face and arms - melanoma     Cerebrovascular Disease Paternal Grandmother      Arthritis Paternal Grandmother      Cancer Paternal Grandmother      skin cancer on face and arms - melanoma     Arthritis Maternal Grandfather      Cancer Maternal Grandfather      skin cancer on face and arms - melanoma     Arthritis Paternal Grandfather      Cancer - colorectal Paternal Grandfather      at age 55     Cancer Paternal Grandfather      skin cancer on face and arms - melanoma     Lipids Paternal Grandfather      Connective Tissue Disorder Mother      carrier of mucopolysarcchardosis     Breast Cancer Other          Current Outpatient  Prescriptions   Medication Sig Dispense Refill     metroNIDAZOLE (FLAGYL) 500 MG tablet Take 1 tablet (500 mg) by mouth 2 times daily 14 tablet 0     Multiple Vitamins-Minerals (DAILY MULTIVITAMIN PO) Take by mouth daily       neomycin-polymyxin-hydrocortisone (CORTISPORIN) 3.5-49129-4 otic suspension Place 4 drops into the right ear 4 times daily 10 mL 0     norgestimate-ethinyl estradiol (PREVIFEM) 0.25-35 MG-MCG per tablet Take 1 tablet by mouth daily 84 tablet 4     valACYclovir (VALTREX) 1000 mg tablet Take 1 tablet (1,000 mg) by mouth 3 times daily 21 tablet 0     Allergies   Allergen Reactions     Cats Other (See Comments)     Runny nose. Itchy eyes.     No Known Allergies      Recent Labs   Lab Test  07/06/18   0944  02/12/14   0942  12/30/11   1110   LDL  84  97  82   HDL  54  61  66   TRIG  110  101  108   ALT  15   --    --    CR  0.61  0.51*   --    GFRESTIMATED  >90  >90   --    GFRESTBLACK  >90  >90   --    POTASSIUM  4.1  3.8   --       BP Readings from Last 3 Encounters:   09/20/18 107/73   09/14/18 122/75   07/06/18 112/78    Wt Readings from Last 3 Encounters:   09/20/18 272 lb (123.4 kg)   09/14/18 273 lb (123.8 kg)   07/06/18 277 lb (125.6 kg)                    Reviewed and updated as needed this visit by clinical staff  Allergies       Reviewed and updated as needed this visit by Provider         Stephanie Carmichael is a 28 year old female who presents with right ear pain and fullness for 2 day(s).   Severity: moderate   Timing:gradual onset  Additional symptoms include none.      History of recurrent otitis: no    Past Medical History:   Diagnosis Date     Acne age 13    face and back     ASCUS with positive high risk HPV 01/03/2013    normal colposcopy 2/5/2013     Herpes zoster     age 10,  back     Irregular menstrual cycle     tx'd with BCP     Seasonal allergies     spring     Current Outpatient Prescriptions   Medication Sig Dispense Refill     metroNIDAZOLE (FLAGYL) 500 MG tablet Take 1  "tablet (500 mg) by mouth 2 times daily 14 tablet 0     Multiple Vitamins-Minerals (DAILY MULTIVITAMIN PO) Take by mouth daily       neomycin-polymyxin-hydrocortisone (CORTISPORIN) 3.5-24938-8 otic suspension Place 4 drops into the right ear 4 times daily 10 mL 0     norgestimate-ethinyl estradiol (PREVIFEM) 0.25-35 MG-MCG per tablet Take 1 tablet by mouth daily 84 tablet 4     valACYclovir (VALTREX) 1000 mg tablet Take 1 tablet (1,000 mg) by mouth 3 times daily 21 tablet 0     Social History   Substance Use Topics     Smoking status: Never Smoker     Smokeless tobacco: Never Used     Alcohol use 0.0 - 0.6 oz/week     0 - 1 Standard drinks or equivalent per week      Comment: 1 drink per month       ROS:   Review of systems negative except as stated above.    OBJECTIVE:  /73 (Cuff Size: Adult Large)  Pulse 92  Temp 98.3  F (36.8  C) (Tympanic)  Resp 14  Ht 5' 10\" (1.778 m)  Wt 272 lb (123.4 kg)  LMP 09/11/2018 (Approximate)  SpO2 100%  BMI 39.03 kg/m2   EXAM:  The right TM is bubbles, bulging, erythematous and purulent drainage     The right auditory canal is normal and without drainage, edema or erythema  The left TM is normal: no effusions, no erythema, and normal landmarks  The left auditory canal is normal and without drainage, edema or erythema  Oropharynx exam is normal: no lesions, erythema, adenopathy or exudate.  GENERAL: no acute distress  EYES: EOMI,  PERRL, conjunctiva clear  NECK: supple, non-tender to palpation, no adenopathy noted  RESP: lungs clear to auscultation - no rales, rhonchi or wheezes  CV: regular rates and rhythm, normal S1 S2, no murmur noted  SKIN: no suspicious lesions or rashes     ASSESSMENT:  Stephanie was seen today for otalgia.    Diagnoses and all orders for this visit:    Infective otitis externa, right  -     neomycin-polymyxin-hydrocortisone (CORTISPORIN) 3.5-43798-4 otic suspension; Place 4 drops into the right ear 4 times daily          "

## 2019-11-05 ENCOUNTER — HEALTH MAINTENANCE LETTER (OUTPATIENT)
Age: 29
End: 2019-11-05

## 2020-11-22 ENCOUNTER — HEALTH MAINTENANCE LETTER (OUTPATIENT)
Age: 30
End: 2020-11-22

## 2021-09-19 ENCOUNTER — HEALTH MAINTENANCE LETTER (OUTPATIENT)
Age: 31
End: 2021-09-19

## 2022-01-09 ENCOUNTER — HEALTH MAINTENANCE LETTER (OUTPATIENT)
Age: 32
End: 2022-01-09

## 2022-11-20 ENCOUNTER — HEALTH MAINTENANCE LETTER (OUTPATIENT)
Age: 32
End: 2022-11-20

## 2023-04-15 ENCOUNTER — HEALTH MAINTENANCE LETTER (OUTPATIENT)
Age: 33
End: 2023-04-15

## 2025-04-23 NOTE — LETTER
9/12/2017         RE: Stephanie Carmichael  [address] [facility] [address]        Dear Colleague,    Thank you for referring your patient, Stephanie Carmichael, to the [facility]. Please see a copy of my visit note below.    Weight management plan: Patient was referred to their PCP to discuss a diet and exercise plan.     LUZ Camejo MA September 12, 2017 4:36 PM      PATIENT HISTORY:  Stephanie Carmichael is a 27 year old female who presents to clinic for right 1st toenail thickening.  Pt reports a procedure here for ingrown nail 3-4 years ago.  New area of darkening noted in nail per pt.  Unsure if there was an injury.  No pain.  Nail has turned yellow.  She has tried an otc antifungal w/o improvement.      Review of Systems:  Patient denies fever, chills, rash, wound, stiffness, limping, numbness, weakness, heart burn, blood in stool, chest pain with activity, calf pain when walking, shortness of breath with activity, chronic cough, easy bleeding/bruising, swelling of ankles, excessive thirst, fatigue, depression, anxiety.       PAST MEDICAL HISTORY:   Past Medical History:   Diagnosis Date     Acne age 13    face and back     ASCUS with positive high risk HPV 01/03/2013    normal colposcopy 2/5/2013     Herpes zoster     age 10,  back     Irregular menstrual cycle     tx'd with BCP     Seasonal allergies     spring        PAST SURGICAL HISTORY:   Past Surgical History:   Procedure Laterality Date     NO HISTORY OF SURGERY          MEDICATIONS:   Current Outpatient Prescriptions:      norgestimate-ethinyl estradiol (PREVIFEM) 0.25-35 MG-MCG per tablet, Take 1 tablet by mouth daily, Disp: 84 tablet, Rfl: 4     Multiple Vitamins-Minerals (DAILY MULTIVITAMIN PO), Take by mouth daily, Disp: , Rfl:      ALLERGIES:    Allergies   Allergen Reactions     Cats Other (See Comments)     Runny nose. Itchy eyes.     No Known Allergies         SOCIAL HISTORY:   Social History     Social History     Marital  status: Single     Spouse name: N/A     Number of children: 0     Years of education: 13     Occupational History           engineering     Social History Main Topics     Smoking status: Never Smoker     Smokeless tobacco: Never Used     Alcohol use 0.0 - 0.6 oz/week     0 - 1 Standard drinks or equivalent per week      Comment: 1 drink per month     Drug use: No     Sexual activity: Yes     Partners: Male     Birth control/ protection: Condom, Pill     Other Topics Concern      Service No     Blood Transfusions No     Caffeine Concern Yes     3 teas and 2 sodas per week     Occupational Exposure No     Hobby Hazards No     Sleep Concern No     Stress Concern No     Weight Concern Yes     Special Diet No     Back Care Yes     has left sided back pain     Exercise Yes     Bike Helmet No     n/a     Seat Belt Yes     Self-Exams No     Parent/Sibling W/ Cabg, Mi Or Angioplasty Before 65f 55m? No     Social History Narrative        FAMILY HISTORY:   Family History   Problem Relation Age of Onset     Hypertension Maternal Grandmother      Arthritis Maternal Grandmother      DIABETES Maternal Grandmother      diet controlled     CANCER Maternal Grandmother      skin cancer on face and arms - melanoma     CEREBROVASCULAR DISEASE Paternal Grandmother      Arthritis Paternal Grandmother      CANCER Paternal Grandmother      skin cancer on face and arms - melanoma     Arthritis Maternal Grandfather      CANCER Maternal Grandfather      skin cancer on face and arms - melanoma     Arthritis Paternal Grandfather      Cancer - colorectal Paternal Grandfather      at age 55     CANCER Paternal Grandfather      skin cancer on face and arms - melanoma     Lipids Paternal Grandfather      Connective Tissue Disorder Mother      carrier of mucopolysarcchardosis     Breast Cancer Other         EXAM:Vitals: /88 (BP Location: Left arm, Patient Position: Chair, Cuff Size: Adult Large)  Pulse 96  Ht  "5' 11\" (1.803 m)  Wt 283 lb 12.8 oz (128.7 kg)  BMI 39.58 kg/m2  BMI= Body mass index is 39.58 kg/(m^2).    General appearance: Patient is alert and fully cooperative with history & exam.  No sign of distress is noted during the visit.     Psychiatric: Affect is pleasant & appropriate.  Patient appears motivated to improve health.     Respiratory: Breathing is regular & unlabored while sitting.     HEENT: Hearing is intact to spoken word.  Speech is clear.  No gross evidence of visual impairment that would impact ambulation.     Dermatologic: R hallux nail thickened, yellow, dystrophic, with subungual bruising.  Part of nail obscured by gel nail polish, which could not be removed.  No paronychia or evidence of soft tissue infection is noted.     Vascular: DP & PT pulses are intact & regular on the right.  No significant edema or varicosities noted.  CFT and skin temperature are normal.     Neurologic: Lower extremity sensation is intact to light touch.  No evidence of weakness or contracture in the lower extremities.  No evidence of neuropathy.     Musculoskeletal: Patient is ambulatory without assistive device or brace.  No gross ankle deformity noted.  No foot or ankle joint effusion is noted.     ASSESSMENT: R hallux onychodystrophy, onychomycosis     PLAN:  Reviewed patient's chart in epic.  Discussed condition and treatment options including pros and cons.    Nail changes likely multifactorial from trauma, fungus.    Discussed causes of nail fungus.  Discussed treatment options with patient and explained that there isn't one treatment that is 100% effective.  Debridement is an option.  Discussed oral lamisil which is the most effective at about 70% but can have liver effects.  Discussed over the counter antifungal creams.  Explained that these are less effective and need to be applied twice a day for about 3-4 months.  Also talked about prescription topicals, which have similar efficacy.  Also discussed that " if there was damage to the nail and the nail is now dystrophic that none of the above is going to change the nail.  Discussed that if it becomes painful, we can remove the nail in clinic.  The patient elected to try otc topical such as Vicks once/day.    Advised f/u if dark area is not clearing as nail grows, as nail avulsion may be needed to inspect underlying skin.  F/u prn.         Froilan Oneill DPM, FACFAS      Again, thank you for allowing me to participate in the care of your patient.        Sincerely,        Froilan Oneill DPM     Repair Anesthesia Method: local infiltration